# Patient Record
Sex: MALE | Race: WHITE | NOT HISPANIC OR LATINO | Employment: UNEMPLOYED | URBAN - NONMETROPOLITAN AREA
[De-identification: names, ages, dates, MRNs, and addresses within clinical notes are randomized per-mention and may not be internally consistent; named-entity substitution may affect disease eponyms.]

---

## 2018-01-13 NOTE — PROGRESS NOTES
Assessment    1  Encounter for preventive health examination (V70 0) (Z00 00)    Plan  Health Maintenance    · Begin a limited exercise program ; Status:Complete;   Done: 12OKJ6973   · Brush your teeth 3 times a day and floss at least once a day ; Status:Complete;   Done:  77OGR6007   · Eat a low fat and low cholesterol diet ; Status:Complete;   Done: 57CPO9464   · Regular aerobic exercise can help reduce stress ; Status:Complete;   Done: 39IYD8720   · Some eating tips that can help you lose weight ; Status:Complete;   Done: 20JOL5681   · There are ways to decrease your stress and improve your sense of well-being  We  encourage you to keep active and exercise regularly  Make time to take care of yourself  and participate in activities that you enjoy  Stay connected to friends and family that can  support and comfort you  If at any time you have thoughts of harming yourself or  someone else, contact us immediately ; Status:Active; Requested for:10Mar2016;    · Use a sun block product with an SPF of 15 or more ; Status:Complete;   Done:  61FSP4046   · Vitamins can help you get daily requirements that your diet may not be giving you ;  Status:Complete;   Done: 11ADF6399   · We encourage all of our patients to exercise regularly  30 minutes of exercise or physical  activity five or more days a week is recommended for children and adults ;  Status:Complete;   Done: 29ZUT2463   · We encourage you to begin to make lifestyle changes to help control your blood  pressure    These may include losing weight, increasing your activity level, limiting salt in  your diet, decreasing alcohol intake, and eating a diet low in fat and rich in fruits  and vegetables ; Status:Complete;   Done: 06OOH1334   · We recommend routine visits to a dentist ; Status:Complete;   Done: 40SZA3852   · We recommend that you bring your body mass index down to 26 ; Status:Complete;    Done: 38PPN5572   · We recommend you modify your diet to achieve and maintain a healthy weight  Being  overweight may increase your risk for developing health problems such as diabetes,  heart disease, and cancer  Avoid high fat foods and eat a balanced diet rich  in fruits and vegetables  The combination of a reduced-calorie diet and increased  physical activity is recommended  Please let us know if you would like to  learn more about your nutrition and calorie needs, and additional options including  weight loss programs that can help you achieve your goals ; Status:Complete;   Done:  12HTU9403   · We recommend you modify your diet to achieve and maintain a healthy weight  Being  underweight may increase your risk of developing health problems from vitamin and  mineral deficiencies  We recommend a balanced diet rich in fruits and vegetables  You  may also consider increasing your calorie intake by eating more frequently or adding  nuts, avocados, and low-fat cheese or milk to your meals  Please let us know  if you would like to learn more about your nutrition and calorie needs, and additional  options to help you achieve your weight goals ; Status:Complete;   Done: 86NXI6781   · You need to stop smoking  Though it is not easy, more than half of all adult smokers  have quit  We encourage you to write down all the reasons you should quit smoking and  set a quit date for yourself  Ask us how we can help  You may also call  [phone] for free resources and assistance ; Status:Complete;   Done:  21OQK1408   · Call [phone] if: You have any warning signs of skin cancer ; Status:Complete;    Done: 12DUE8882    Chief Complaint  WELLNESS      History of Present Illness  HM, Adult Male: The patient is being seen for a health maintenance evaluation  The last health maintenance visit was 12 month(s) ago  Social History: Household members include mother  He is unmarried  Work status: working full time and occupation:    The patient is a current cigarette smoker  He is ready to quit using tobacco  He reports occasional alcohol use  He has previously used illicit drugs  General Health: The patient's health since the last visit is described as good  He does not have regular dental visits  He denies vision problems  He has hearing loss  Immunizations status: up to date  Lifestyle:  He consumes a diverse and healthy diet  He does not have any weight concerns  He exercises regularly  He uses tobacco  He consumes alcohol  He denies drug use  Reproductive health:  the patient is sexually active  Screening: cancer screening reviewed and current  metabolic screening reviewed and current  risk screening reviewed and current  Review of Systems    Constitutional: No fever or chills, feels well, no tiredness, no recent weight gain or weight loss  Eyes: No complaints of eye pain, no red eyes, no discharge from eyes, no itchy eyes  ENT: no complaints of earache, no hearing loss, no nosebleeds, no nasal discharge, no sore throat, no hoarseness  Cardiovascular: No complaints of slow heart rate, no fast heart rate, no chest pain, no palpitations, no leg claudication, no lower extremity  Respiratory: No complaints of shortness of breath, no wheezing, no cough, no SOB on exertion, no orthopnea or PND  Gastrointestinal: No complaints of abdominal pain, no constipation, no nausea or vomiting, no diarrhea or bloody stools  Genitourinary: No complaints of dysuria, no incontinence, no hesitancy, no nocturia, no genital lesion, no testicular pain  Musculoskeletal: No complaints of arthralgia, no myalgias, no joint swelling or stiffness, no limb pain or swelling  Integumentary: No complaints of skin rash or skin lesions, no itching, no skin wound, no dry skin  Neurological: No compliants of headache, no confusion, no convulsions, no numbness or tingling, no dizziness or fainting, no limb weakness, no difficulty walking     Psychiatric: Is not suicidal, no sleep disturbances, no anxiety or depression, no change in personality, no emotional problems  Endocrine: No complaints of proptosis, no hot flashes, no muscle weakness, no erectile dysfunction, no deepening of the voice, no feelings of weakness  Hematologic/Lymphatic: No complaints of swollen glands, no swollen glands in the neck, does not bleed easily, no easy bruising  Active Problems    1  Abdominal pain, epigastric (789 06) (R10 13)   2  Ankle sprain and strain (845 00) (S93 409A)   3  Drug dependence, in remission (304 93) (F19 21)   4  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)   5  Opioid dependence on agonist therapy (304 00) (F11 20)   6  Tobacco use (305 1) (Z72 0)    Surgical History    · History of Oral Surgery Tooth Extraction    Family History    · Family history of Denial Of Any Significant Medical History    · Family history of Alcoholism   · Family history of History Unobtainable    Social History    · Current Every Day Smoker (305 1)   · Tobacco use (305 1) (Z72 0)    Current Meds   1  NexIUM 40 MG Oral Capsule Delayed Release; TAKE 1 CAPSULE DAILY AS NEEDED; Therapy: 12Sep2014 to (Evaluate:11Mar2015) Recorded    Allergies    1  No Known Drug Allergies    Vitals   Recorded: 70UQR2232 02:06PM   Heart Rate 81   Systolic 790   Diastolic 74   Weight 641 lb    BMI Calculated 20 64   BSA Calculated 1 86   O2 Saturation 98     Physical Exam    Constitutional   General appearance: No acute distress, well appearing and well nourished  Pulmonary   Respiratory effort: No increased work of breathing or signs of respiratory distress  Palpation of chest: Normal     Cardiovascular   Auscultation of heart: Normal rate and rhythm, normal S1 and S2, no murmurs  Carotid pulses: 2+ bilaterally  Abdominal aorta: Normal     Abdomen   Abdomen: Non-tender, no masses  Liver and spleen: No hepatomegaly or splenomegaly         Signatures   Electronically signed by : Alden Mehta Silver Navarrete MD; Mar 20 2016 10:38PM EST                       (Author)

## 2023-01-05 ENCOUNTER — OFFICE VISIT (OUTPATIENT)
Dept: URGENT CARE | Facility: CLINIC | Age: 38
End: 2023-01-05

## 2023-01-05 ENCOUNTER — APPOINTMENT (OUTPATIENT)
Dept: RADIOLOGY | Facility: CLINIC | Age: 38
End: 2023-01-05

## 2023-01-05 VITALS
TEMPERATURE: 98.5 F | OXYGEN SATURATION: 99 % | SYSTOLIC BLOOD PRESSURE: 125 MMHG | HEART RATE: 75 BPM | WEIGHT: 152 LBS | RESPIRATION RATE: 20 BRPM | BODY MASS INDEX: 21.2 KG/M2 | DIASTOLIC BLOOD PRESSURE: 70 MMHG

## 2023-01-05 DIAGNOSIS — R05.1 ACUTE COUGH: ICD-10-CM

## 2023-01-05 DIAGNOSIS — U07.1 COVID-19: ICD-10-CM

## 2023-01-05 DIAGNOSIS — R09.89 SUSPECTED NOVEL INFLUENZA A VIRUS INFECTION: Primary | ICD-10-CM

## 2023-01-05 RX ORDER — OSELTAMIVIR PHOSPHATE 75 MG/1
75 CAPSULE ORAL EVERY 12 HOURS SCHEDULED
Qty: 10 CAPSULE | Refills: 0 | Status: SHIPPED | OUTPATIENT
Start: 2023-01-05 | End: 2023-01-06

## 2023-01-05 RX ORDER — BENZONATATE 200 MG/1
200 CAPSULE ORAL 3 TIMES DAILY PRN
Qty: 20 CAPSULE | Refills: 0 | Status: SHIPPED | OUTPATIENT
Start: 2023-01-05

## 2023-01-05 NOTE — PROGRESS NOTES
[facility]        NAME: Jennifer Rose is a 40 y o  male  : 1985    MRN: [id number]  DATE: 2023  TIME: 9:48 AM    Assessment and Plan   Suspected novel influenza A virus infection [R09 89]  1  Suspected novel influenza A virus infection  oseltamivir (TAMIFLU) 75 mg capsule    benzonatate (TESSALON) 200 MG capsule      2  Acute cough  XR chest pa & lateral    Covid/Flu-Office Collect    benzonatate (TESSALON) 200 MG capsule            Patient Instructions   Patient Instructions   Influenza A Virus Vaccine, H5N1, Adjuvanted (By injection)   Influenza A Virus Vaccine, H5N1, Adjuvanted (in-floo-EN-za VYE-juancarlos VAX-een, H5N1, [id number]-qlq-ady)  Helps prevent infection with influenza (flu) virus  Brand Name(s):   There may be other brand names for this medicine  When This Medicine Should Not Be Used: This vaccine is not right for everyone  You should not receive it if you had an allergic reaction to influenza virus vaccine, H5N1, or from a previous dose of a flu shot  How to Use This Medicine:   Injectable  · The vaccine is given as a shot into a muscle, usually in the upper arm  The shot could be given in the thighs for babies 6 to 11 months  · A nurse or other health provider will give you this medicine  · You will need two doses of this vaccine for it to work properly  Make sure you follow your doctor's instructions on when you or your child should come back for the second dose  The second dose is usually given 21 days after the first dose  · Missed dose: You must use this medicine on a fixed schedule  Call your doctor or pharmacist if you miss a dose  Drugs and Foods to Avoid:   Ask your doctor or pharmacist before using any other medicine, including over-the-counter medicines, vitamins, and herbal products  · Tell your doctor if you are using a medicine or treatment that weakens your immune system, including a steroid, radiation, or cancer treatment   This vaccine may not work as well if you are also using these medicines  However, your doctor may still want you to get the vaccine because it can give you some protection  Warnings While Using This Medicine:   · Tell your doctor if you are pregnant or breastfeeding or if you have a weak immune system  · Tell your doctor if you ever had an unusual reaction to a flu shot, including Guillain-Barré syndrome  · This flu vaccine may not protect everyone who receives it  This vaccine will not treat flu symptoms if you have already been infected with the virus  Possible Side Effects While Using This Medicine:   Call your doctor right away if you notice any of these side effects:  · Allergic reaction: Itching or hives, swelling in your face or hands, swelling or tingling in your mouth or throat, chest tightness, trouble breathing  · Fever  · Severe muscle weakness  If you notice these less serious side effects, talk with your doctor:   · Headache, joint or muscle pain, tiredness  · Irritability, sleepiness, tenderness, loss of appetite (in children)  · Redness, pain, swelling, soreness, or a lump where the shot was given  If you notice other side effects that you think are caused by this medicine, tell your doctor  Call your doctor for medical advice about side effects  You may report side effects to FDA at 7-155-FDA-5323    © Copyright [employer] [facility] 2022 Information is for End User's use only and may not be sold, redistributed or otherwise used for commercial purposes  The above information is an  only  It is not intended as medical advice for individual conditions or treatments  Talk to your doctor, nurse or pharmacist before following any medical regimen to see if it is safe and effective for you  Follow up with PCP in 3-5 days  Proceed to  ER if symptoms worsen  Chief Complaint     Chief Complaint   Patient presents with   • Cold Like Symptoms     Head and chest congestion since yesterday    Used Mucinex this AM  History of Present Illness       The patient is a 80-year-old male with past medical history significant for tobacco use who presents to the clinic for cough, nasal congestion, generalized weakness, and wheezing for approximately 2 days  He is not vaccinated against COVID or the flu  Patient states that multiple family members are also sick  Review of Systems   Review of Systems   Constitutional: Negative for chills and fever  HENT: Positive for congestion, rhinorrhea, sinus pressure and sore throat  Negative for ear pain  Eyes: Negative for pain and visual disturbance  Respiratory: Positive for cough, shortness of breath and wheezing  Cardiovascular: Negative for chest pain and palpitations  Gastrointestinal: Negative for abdominal pain and vomiting  Genitourinary: Negative for dysuria and hematuria  Musculoskeletal: Positive for myalgias  Negative for arthralgias and back pain  Skin: Negative for color change and rash  Neurological: Positive for dizziness and headaches  Negative for seizures and syncope  All other systems reviewed and are negative  Current Medications       Current Outpatient Medications:   •  benzonatate (TESSALON) 200 MG capsule, Take 1 capsule (200 mg total) by mouth 3 (three) times a day as needed for cough, Disp: 20 capsule, Rfl: 0  •  oseltamivir (TAMIFLU) 75 mg capsule, Take 1 capsule (75 mg total) by mouth every 12 (twelve) hours for 5 days, Disp: 10 capsule, Rfl: 0    Current Allergies     Allergies as of 01/05/2023   • (No Known Allergies)            The following portions of the patient's history were reviewed and updated as appropriate: allergies, current medications, past family history, past medical history, past social history, past surgical history and problem list      History reviewed  No pertinent past medical history  No past surgical history on file  No family history on file        Medications have been verified  Objective   /70   Pulse 75   Temp 98 5 °F (36 9 °C)   Resp 20   Wt 68 9 kg (152 lb)   SpO2 99%   BMI 21 20 kg/m²        Physical Exam     Physical Exam  Constitutional:       Appearance: He is well-developed  HENT:      Head: Normocephalic  Right Ear: Tympanic membrane normal       Left Ear: Tympanic membrane normal       Nose: Congestion and rhinorrhea present  Eyes:      General:         Left eye: No discharge  Pupils: Pupils are equal, round, and reactive to light  Neck:      Thyroid: No thyromegaly  Trachea: No tracheal deviation  Cardiovascular:      Rate and Rhythm: Normal rate and regular rhythm  Heart sounds: No murmur heard  Pulmonary:      Effort: Pulmonary effort is normal  No respiratory distress  Breath sounds: Wheezing and rhonchi present  No rales  Chest:      Chest wall: No tenderness  Abdominal:      General: Bowel sounds are normal  There is no distension  Palpations: Abdomen is soft  There is no mass  Tenderness: There is no abdominal tenderness  There is no guarding or rebound  Musculoskeletal:         General: Normal range of motion  Cervical back: Normal range of motion  Skin:     General: Skin is warm  Neurological:      Mental Status: He is alert              -Chest x-ray was reviewed  There is no acute infiltrate noted  -Patient likely has influenza therefore I will treat with Tamiflu  Patient was instructed to follow-up with PCP or go to ER if symptoms worsen  Flu and COVID swab were sent to the lab

## 2023-01-05 NOTE — PATIENT INSTRUCTIONS
Influenza A Virus Vaccine, H5N1, Adjuvanted (By injection)   Influenza A Virus Vaccine, H5N1, Adjuvanted (in-floo-EN-za VYE-juancarlos VAX-een, H5N1, [id number]-kpqisha)  Helps prevent infection with influenza (flu) virus  Brand Name(s):   There may be other brand names for this medicine  When This Medicine Should Not Be Used: This vaccine is not right for everyone  You should not receive it if you had an allergic reaction to influenza virus vaccine, H5N1, or from a previous dose of a flu shot  How to Use This Medicine:   Injectable  The vaccine is given as a shot into a muscle, usually in the upper arm  The shot could be given in the thighs for babies 6 to 11 months  A nurse or other health provider will give you this medicine  You will need two doses of this vaccine for it to work properly  Make sure you follow your doctor's instructions on when you or your child should come back for the second dose  The second dose is usually given 21 days after the first dose  Missed dose: You must use this medicine on a fixed schedule  Call your doctor or pharmacist if you miss a dose  Drugs and Foods to Avoid:   Ask your doctor or pharmacist before using any other medicine, including over-the-counter medicines, vitamins, and herbal products  Tell your doctor if you are using a medicine or treatment that weakens your immune system, including a steroid, radiation, or cancer treatment  This vaccine may not work as well if you are also using these medicines  However, your doctor may still want you to get the vaccine because it can give you some protection  Warnings While Using This Medicine:   Tell your doctor if you are pregnant or breastfeeding or if you have a weak immune system  Tell your doctor if you ever had an unusual reaction to a flu shot, including Guillain-Barré syndrome  This flu vaccine may not protect everyone who receives it   This vaccine will not treat flu symptoms if you have already been infected with the virus  Possible Side Effects While Using This Medicine:   Call your doctor right away if you notice any of these side effects: Allergic reaction: Itching or hives, swelling in your face or hands, swelling or tingling in your mouth or throat, chest tightness, trouble breathing  Fever  Severe muscle weakness  If you notice these less serious side effects, talk with your doctor:   Headache, joint or muscle pain, tiredness  Irritability, sleepiness, tenderness, loss of appetite (in children)  Redness, pain, swelling, soreness, or a lump where the shot was given  If you notice other side effects that you think are caused by this medicine, tell your doctor  Call your doctor for medical advice about side effects  You may report side effects to FDA at 0-297-FDA-8825    © Copyright "[employer] 2022 Information is for End User's use only and may not be sold, redistributed or otherwise used for commercial purposes  The above information is an  only  It is not intended as medical advice for individual conditions or treatments  Talk to your doctor, nurse or pharmacist before following any medical regimen to see if it is safe and effective for you

## 2023-01-06 LAB
FLUAV RNA RESP QL NAA+PROBE: NEGATIVE
FLUBV RNA RESP QL NAA+PROBE: NEGATIVE
SARS-COV-2 RNA RESP QL NAA+PROBE: POSITIVE

## 2023-01-06 RX ORDER — NIRMATRELVIR AND RITONAVIR 300-100 MG
3 KIT ORAL 2 TIMES DAILY
Qty: 30 TABLET | Refills: 0 | Status: SHIPPED | OUTPATIENT
Start: 2023-01-06 | End: 2023-01-11

## 2023-06-22 ENCOUNTER — OFFICE VISIT (OUTPATIENT)
Dept: URGENT CARE | Facility: CLINIC | Age: 38
End: 2023-06-22
Payer: COMMERCIAL

## 2023-06-22 VITALS
HEART RATE: 92 BPM | BODY MASS INDEX: 21.2 KG/M2 | DIASTOLIC BLOOD PRESSURE: 74 MMHG | OXYGEN SATURATION: 99 % | TEMPERATURE: 98.3 F | SYSTOLIC BLOOD PRESSURE: 125 MMHG | RESPIRATION RATE: 18 BRPM | WEIGHT: 152 LBS

## 2023-06-22 DIAGNOSIS — L25.9 CONTACT DERMATITIS, UNSPECIFIED CONTACT DERMATITIS TYPE, UNSPECIFIED TRIGGER: Primary | ICD-10-CM

## 2023-06-22 PROCEDURE — 99282 EMERGENCY DEPT VISIT SF MDM: CPT | Performed by: PHYSICIAN ASSISTANT

## 2023-06-22 PROCEDURE — G0381 LEV 2 HOSP TYPE B ED VISIT: HCPCS | Performed by: PHYSICIAN ASSISTANT

## 2023-06-22 RX ORDER — METHYLPREDNISOLONE 4 MG/1
TABLET ORAL
Qty: 1 EACH | Refills: 0 | Status: SHIPPED | OUTPATIENT
Start: 2023-06-22

## 2023-06-22 NOTE — PROGRESS NOTES
[facility]        NAME: Delon Lozano is a 45 y o  male  : 1985    MRN: [id number]  DATE: 2023  TIME: 2:26 PM    Assessment and Plan   Contact dermatitis, unspecified contact dermatitis type, unspecified trigger [L25 9]  1  Contact dermatitis, unspecified contact dermatitis type, unspecified trigger  methylPREDNISolone 4 MG tablet therapy pack            Patient Instructions       Follow up with PCP in 3-5 days  Proceed to  ER if symptoms worsen  Chief Complaint     Chief Complaint   Patient presents with   • Rash         History of Present Illness       Patient presents with an itchy rash which started at 8:00 this morning while pulling brush  No changes in lotions, soaps or detergents  Hx of Chicken pos as child  Review of Systems   Review of Systems   Constitutional: Negative for fever  Musculoskeletal: Negative for arthralgias  Skin: Positive for rash  Hematological: Negative for adenopathy  Current Medications       Current Outpatient Medications:   •  methylPREDNISolone 4 MG tablet therapy pack, Use as directed on package, Disp: 1 each, Rfl: 0  •  benzonatate (TESSALON) 200 MG capsule, Take 1 capsule (200 mg total) by mouth 3 (three) times a day as needed for cough, Disp: 20 capsule, Rfl: 0    Current Allergies     Allergies as of 2023   • (No Known Allergies)            The following portions of the patient's history were reviewed and updated as appropriate: allergies, current medications, past family history, past medical history, past social history, past surgical history and problem list      History reviewed  No pertinent past medical history  Past Surgical History:   Procedure Laterality Date   • WISDOM TOOTH EXTRACTION         History reviewed  No pertinent family history  Medications have been verified          Objective   /74   Pulse 92   Temp 98 3 °F (36 8 °C)   Resp 18   Wt 68 9 kg (152 lb)   SpO2 99%   BMI 21 20 kg/m²   No LMP for male patient  Physical Exam     Physical Exam  Vitals and nursing note reviewed  Constitutional:       Appearance: Normal appearance  HENT:      Head: Normocephalic and atraumatic  Cardiovascular:      Rate and Rhythm: Normal rate and regular rhythm  Pulmonary:      Effort: Pulmonary effort is normal    Skin:     General: Skin is warm  Findings: Rash (macuopapular rash dorsum right hand with linear component, no dranage ) present  Neurological:      Mental Status: He is alert